# Patient Record
Sex: FEMALE | HISPANIC OR LATINO | URBAN - METROPOLITAN AREA
[De-identification: names, ages, dates, MRNs, and addresses within clinical notes are randomized per-mention and may not be internally consistent; named-entity substitution may affect disease eponyms.]

---

## 2021-10-11 ENCOUNTER — OFFICE VISIT (OUTPATIENT)
Dept: URBAN - METROPOLITAN AREA CLINIC 82 | Facility: CLINIC | Age: 51
End: 2021-10-11
Payer: COMMERCIAL

## 2021-10-11 ENCOUNTER — DASHBOARD ENCOUNTERS (OUTPATIENT)
Age: 51
End: 2021-10-11

## 2021-10-11 ENCOUNTER — LAB OUTSIDE AN ENCOUNTER (OUTPATIENT)
Dept: URBAN - METROPOLITAN AREA CLINIC 82 | Facility: CLINIC | Age: 51
End: 2021-10-11

## 2021-10-11 VITALS
BODY MASS INDEX: 30.36 KG/M2 | WEIGHT: 160.8 LBS | TEMPERATURE: 97.2 F | HEIGHT: 61 IN | DIASTOLIC BLOOD PRESSURE: 75 MMHG | SYSTOLIC BLOOD PRESSURE: 136 MMHG | HEART RATE: 48 BPM

## 2021-10-11 DIAGNOSIS — E66.9 OBESITY (BMI 30.0-34.9): ICD-10-CM

## 2021-10-11 DIAGNOSIS — Z12.11 ENCOUNTER FOR SCREENING FOR MALIGNANT NEOPLASM OF COLON: ICD-10-CM

## 2021-10-11 DIAGNOSIS — Z12.12 ENCOUNTER FOR SCREENING FOR MALIGNANT NEOPLASM OF RECTUM: ICD-10-CM

## 2021-10-11 PROBLEM — 443371000124107: Status: ACTIVE | Noted: 2021-10-11

## 2021-10-11 PROBLEM — 305058001: Status: ACTIVE | Noted: 2021-10-11

## 2021-10-11 PROCEDURE — 99203 OFFICE O/P NEW LOW 30 MIN: CPT | Performed by: INTERNAL MEDICINE

## 2021-10-11 NOTE — HPI-TODAY'S VISIT:
52 y/o Female  with obesity that came to schedule her first screening colonoscopy.No alarm symptoms or signs.No family hx of CRC. No smoke.

## 2021-11-11 PROBLEM — 305058001: Status: ACTIVE | Noted: 2021-10-11

## 2021-11-12 ENCOUNTER — OFFICE VISIT (OUTPATIENT)
Dept: URBAN - METROPOLITAN AREA SURGERY CENTER 13 | Facility: SURGERY CENTER | Age: 51
End: 2021-11-12
Payer: COMMERCIAL

## 2021-11-12 DIAGNOSIS — D12.0 ADENOMA OF CECUM: ICD-10-CM

## 2021-11-12 PROBLEM — 724538004: Status: ACTIVE | Noted: 2021-11-12

## 2021-11-12 PROCEDURE — 45385 COLONOSCOPY W/LESION REMOVAL: CPT | Performed by: INTERNAL MEDICINE

## 2021-11-12 PROCEDURE — G8907 PT DOC NO EVENTS ON DISCHARG: HCPCS | Performed by: INTERNAL MEDICINE
